# Patient Record
Sex: MALE | Race: BLACK OR AFRICAN AMERICAN | NOT HISPANIC OR LATINO | Employment: OTHER | URBAN - METROPOLITAN AREA
[De-identification: names, ages, dates, MRNs, and addresses within clinical notes are randomized per-mention and may not be internally consistent; named-entity substitution may affect disease eponyms.]

---

## 2017-08-03 PROBLEM — M25.60 LIMITED JOINT RANGE OF MOTION (ROM): Status: ACTIVE | Noted: 2017-08-03

## 2017-08-03 PROBLEM — M25.552 ACUTE PAIN OF LEFT HIP: Status: ACTIVE | Noted: 2017-08-03

## 2017-08-03 PROBLEM — R29.898 WEAKNESS OF LEFT HIP: Status: ACTIVE | Noted: 2017-08-03

## 2017-08-03 PROBLEM — S79.912A INJURY OF LEFT HIP: Status: ACTIVE | Noted: 2017-08-03

## 2018-07-23 PROBLEM — N47.1 PHIMOSIS: Status: ACTIVE | Noted: 2018-07-23

## 2018-09-26 PROBLEM — M25.552 ACUTE PAIN OF LEFT HIP: Status: RESOLVED | Noted: 2017-08-03 | Resolved: 2018-09-26

## 2018-09-26 PROBLEM — R29.898 WEAKNESS OF LEFT HIP: Status: RESOLVED | Noted: 2017-08-03 | Resolved: 2018-09-26

## 2018-10-11 PROBLEM — R97.20 ELEVATED PSA: Status: ACTIVE | Noted: 2018-10-11

## 2018-12-20 PROBLEM — H10.33 ACUTE CONJUNCTIVITIS OF BOTH EYES: Status: ACTIVE | Noted: 2018-12-20

## 2019-03-08 PROBLEM — S79.912A INJURY OF LEFT HIP: Status: RESOLVED | Noted: 2017-08-03 | Resolved: 2019-03-08

## 2019-05-27 ENCOUNTER — PATIENT OUTREACH (OUTPATIENT)
Dept: ADMINISTRATIVE | Facility: HOSPITAL | Age: 54
End: 2019-05-27

## 2019-07-23 ENCOUNTER — PATIENT OUTREACH (OUTPATIENT)
Dept: ADMINISTRATIVE | Facility: HOSPITAL | Age: 54
End: 2019-07-23

## 2020-08-04 ENCOUNTER — HOSPITAL ENCOUNTER (OUTPATIENT)
Dept: RADIOLOGY | Facility: HOSPITAL | Age: 55
Discharge: HOME | End: 2020-08-04
Attending: PHYSICIAN ASSISTANT
Payer: MEDICAID

## 2020-08-04 DIAGNOSIS — N40.1 BPH WITH OBSTRUCTION/LOWER URINARY TRACT SYMPTOMS: ICD-10-CM

## 2020-08-04 DIAGNOSIS — N13.8 BPH WITH OBSTRUCTION/LOWER URINARY TRACT SYMPTOMS: ICD-10-CM

## 2020-08-04 DIAGNOSIS — R97.20 ELEVATED PSA: ICD-10-CM

## 2020-08-04 PROCEDURE — 72197 MRI PELVIS W/O & W/DYE: CPT | Mod: TC

## 2020-08-04 PROCEDURE — A9585 GADOBUTROL INJECTION: HCPCS | Performed by: PHYSICIAN ASSISTANT

## 2020-08-04 PROCEDURE — 72197 MRI PELVIS W/O & W/DYE: CPT | Mod: 26,,, | Performed by: RADIOLOGY

## 2020-08-04 PROCEDURE — 72197 MRI PROSTATE W W/O CONTRAST: ICD-10-PCS | Mod: 26,,, | Performed by: RADIOLOGY

## 2020-08-04 PROCEDURE — 25500020 PHARM REV CODE 255: Performed by: PHYSICIAN ASSISTANT

## 2020-08-04 RX ORDER — GADOBUTROL 604.72 MG/ML
10 INJECTION INTRAVENOUS
Status: COMPLETED | OUTPATIENT
Start: 2020-08-04 | End: 2020-08-04

## 2020-08-04 RX ADMIN — GADOBUTROL 10 ML: 604.72 INJECTION INTRAVENOUS at 04:08

## 2020-11-10 ENCOUNTER — TELEPHONE (OUTPATIENT)
Dept: UROLOGY | Facility: CLINIC | Age: 55
End: 2020-11-10

## 2020-11-10 NOTE — TELEPHONE ENCOUNTER
Left message that the pt has been seeing dr jean and is already scheduled to have the space oars placed by dr. jean

## 2020-11-10 NOTE — TELEPHONE ENCOUNTER
----- Message from Aixa Washington sent at 11/10/2020  9:43 AM CST -----  [facility] is  calling regarding a referral to Dr. Ugalde about see a pt for spaceoar markers.  Did you rec'd her fax.  she faxed it to [phone] on 11/3/20. Call back [phone]

## 2020-11-16 ENCOUNTER — TELEPHONE (OUTPATIENT)
Dept: UROLOGY | Facility: CLINIC | Age: 55
End: 2020-11-16

## 2020-11-16 NOTE — TELEPHONE ENCOUNTER
Spoke to raghav, pt has medicare. He will need space oars only. I informed her it may not be done until January as dr. Ugalde's schedule is full. She will speak to dr. Santoyo and call me back

## 2020-11-16 NOTE — TELEPHONE ENCOUNTER
----- Message from Arely Sanchez LPN sent at 11/10/2020  2:00 PM CST -----  Contact: Angelic with Dr. Yarelis chawla office set him up to see dr. Alvarenga for markers and space oars. He is medicaid. Dr. Alvarenga said he can do the markers only, not the space oars. They want you to do the space oars only. I told her I would ask you and get back to her after you return from vacation. However I did not know if you would want to only do one part of a 2 part procedure, also he is medicaid.   ----- Message -----  From: Ramirez Linda  Sent: 11/10/2020  11:45 AM CST  To: [facility]ft[facility] STUART Staff    Nurse with Dr. Santoyo office is calling to speak with staff with Dr. Ugalde regarding having a spaceoar done     Please contact with Angelic Santoyo office [phone]

## 2020-11-25 PROBLEM — E78.5 HYPERLIPIDEMIA LDL GOAL <70: Status: ACTIVE | Noted: 2020-11-25

## 2020-11-25 PROBLEM — E78.2 MIXED HYPERLIPIDEMIA: Status: ACTIVE | Noted: 2020-11-25

## 2021-07-01 ENCOUNTER — PATIENT MESSAGE (OUTPATIENT)
Dept: ADMINISTRATIVE | Facility: OTHER | Age: 56
End: 2021-07-01

## 2021-09-24 ENCOUNTER — PATIENT OUTREACH (OUTPATIENT)
Dept: ADMINISTRATIVE | Facility: HOSPITAL | Age: 56
End: 2021-09-24

## 2022-02-16 ENCOUNTER — PATIENT MESSAGE (OUTPATIENT)
Dept: ADMINISTRATIVE | Facility: HOSPITAL | Age: 57
End: 2022-02-16
Payer: MEDICARE

## 2023-03-29 DIAGNOSIS — E11.9 TYPE 2 DIABETES MELLITUS WITHOUT COMPLICATION: ICD-10-CM

## 2023-07-10 ENCOUNTER — PATIENT MESSAGE (OUTPATIENT)
Dept: ADMINISTRATIVE | Facility: HOSPITAL | Age: 58
End: 2023-07-10
Payer: MEDICAID

## 2023-12-22 PROBLEM — Z79.899 LONG-TERM USE OF HIGH-RISK MEDICATION: Status: ACTIVE | Noted: 2023-12-22

## 2023-12-22 PROBLEM — Z79.4 TYPE 2 DIABETES MELLITUS WITH HYPOGLYCEMIA WITHOUT COMA, WITH LONG-TERM CURRENT USE OF INSULIN: Status: ACTIVE | Noted: 2023-12-22

## 2023-12-22 PROBLEM — E11.649 TYPE 2 DIABETES MELLITUS WITH HYPOGLYCEMIA WITHOUT COMA, WITH LONG-TERM CURRENT USE OF INSULIN: Status: ACTIVE | Noted: 2023-12-22

## 2024-07-01 LAB
LEFT EYE DM RETINOPATHY: NEGATIVE
LEFT EYE DM RETINOPATHY: NEGATIVE
RIGHT EYE DM RETINOPATHY: NEGATIVE
RIGHT EYE DM RETINOPATHY: NEGATIVE

## 2024-07-23 ENCOUNTER — PATIENT OUTREACH (OUTPATIENT)
Dept: ADMINISTRATIVE | Facility: HOSPITAL | Age: 59
End: 2024-07-23
Payer: MEDICAID

## 2024-12-18 DIAGNOSIS — E11.9 TYPE 2 DIABETES MELLITUS WITHOUT COMPLICATION: ICD-10-CM

## 2025-08-04 ENCOUNTER — PATIENT OUTREACH (OUTPATIENT)
Dept: ADMINISTRATIVE | Facility: HOSPITAL | Age: 60
End: 2025-08-04
Payer: MEDICAID